# Patient Record
Sex: MALE | URBAN - METROPOLITAN AREA
[De-identification: names, ages, dates, MRNs, and addresses within clinical notes are randomized per-mention and may not be internally consistent; named-entity substitution may affect disease eponyms.]

---

## 2019-04-17 ENCOUNTER — APPOINTMENT (RX ONLY)
Dept: URBAN - METROPOLITAN AREA CLINIC 22 | Facility: CLINIC | Age: 24
Setting detail: DERMATOLOGY
End: 2019-04-17

## 2019-04-17 DIAGNOSIS — Z71.89 OTHER SPECIFIED COUNSELING: ICD-10-CM

## 2019-04-17 DIAGNOSIS — A63.0 ANOGENITAL (VENEREAL) WARTS: ICD-10-CM

## 2019-04-17 PROCEDURE — ? COUNSELING

## 2019-04-17 PROCEDURE — ? LIQUID NITROGEN

## 2019-04-17 PROCEDURE — ? PRESCRIPTION

## 2019-04-17 PROCEDURE — 17111 DESTRUCTION B9 LESIONS 15/>: CPT

## 2019-04-17 RX ORDER — IMIQUIMOD 50 MG/G
CREAM TOPICAL
Qty: 1 | Refills: 0 | Status: ERX | COMMUNITY
Start: 2019-04-17

## 2019-04-17 RX ADMIN — IMIQUIMOD 1: 50 CREAM TOPICAL at 00:00

## 2019-04-17 ASSESSMENT — LOCATION DETAILED DESCRIPTION DERM
LOCATION DETAILED: LEFT DORSAL SHAFT OF PENIS
LOCATION DETAILED: VENTRAL PENILE SHAFT
LOCATION DETAILED: RIGHT DORSAL SHAFT OF PENIS
LOCATION DETAILED: DORSAL GLANS
LOCATION DETAILED: DORSAL PENILE SHAFT

## 2019-04-17 ASSESSMENT — LOCATION ZONE DERM: LOCATION ZONE: PENIS

## 2019-04-17 ASSESSMENT — LOCATION SIMPLE DESCRIPTION DERM: LOCATION SIMPLE: PENIS

## 2019-04-17 NOTE — PROCEDURE: LIQUID NITROGEN
Post-Care Instructions: I reviewed with the patient in detail post-care instructions. Patient is to wear sunprotection, and avoid picking at any of the treated lesions. Pt may apply Vaseline to crusted or scabbing areas.
Medical Necessity Information: It is in your best interest to select a reason for this procedure from the list below. All of these items fulfill various CMS LCD requirements except the new and changing color options.
Include Z78.9 (Other Specified Conditions Influencing Health Status) As An Associated Diagnosis?: No
Consent: The patient's consent was obtained including but not limited to risks of crusting, scabbing, blistering, scarring, darker or lighter pigmentary change, recurrence, incomplete removal and infection.
Detail Level: Detailed
Number Of Freeze-Thaw Cycles: 3 freeze-thaw cycles
Medical Necessity Clause: This procedure was medically necessary because the lesions that were treated were:

## 2020-11-19 ENCOUNTER — APPOINTMENT (RX ONLY)
Dept: URBAN - METROPOLITAN AREA CLINIC 22 | Facility: CLINIC | Age: 25
Setting detail: DERMATOLOGY
End: 2020-11-19

## 2020-11-19 DIAGNOSIS — A63.0 ANOGENITAL (VENEREAL) WARTS: ICD-10-CM

## 2020-11-19 DIAGNOSIS — L942 OTHER SPECIFIED DISORDERS OF SKIN: ICD-10-CM

## 2020-11-19 DIAGNOSIS — L988 OTHER SPECIFIED DISORDERS OF SKIN: ICD-10-CM

## 2020-11-19 PROBLEM — D29.0 BENIGN NEOPLASM OF PENIS: Status: ACTIVE | Noted: 2020-11-19

## 2020-11-19 PROCEDURE — ? PRESCRIPTION

## 2020-11-19 PROCEDURE — 99213 OFFICE O/P EST LOW 20 MIN: CPT

## 2020-11-19 PROCEDURE — ? COUNSELING

## 2020-11-19 RX ORDER — IMIQUIMOD 50 MG/G
CREAM TOPICAL TIW
Qty: 1 | Refills: 1 | Status: ERX | COMMUNITY
Start: 2020-11-19

## 2020-11-19 RX ADMIN — IMIQUIMOD 1: 50 CREAM TOPICAL at 00:00

## 2020-11-19 ASSESSMENT — LOCATION ZONE DERM: LOCATION ZONE: PENIS

## 2020-11-19 ASSESSMENT — LOCATION DETAILED DESCRIPTION DERM
LOCATION DETAILED: DORSAL CORONA OF GLANS
LOCATION DETAILED: VENTRAL PENILE SHAFT

## 2020-11-19 ASSESSMENT — LOCATION SIMPLE DESCRIPTION DERM: LOCATION SIMPLE: PENIS

## 2021-02-01 ENCOUNTER — APPOINTMENT (OUTPATIENT)
Dept: PHYSICAL MEDICINE AND REHAB | Facility: MEDICAL CENTER | Age: 26
End: 2021-02-01

## 2021-02-01 NOTE — PROGRESS NOTES
New patient note    Physiatry (physical medicine and  Rehabilitation), interventional spine and sports medicine    Date of service: See epic    Chief complaint: No chief complaint on file.       Referring provider: No ref. provider found ***    HISTORY    HPI: Vincenzo Babb 25 y.o.  who presents today with There were no encounter diagnoses.    HPI    ***       Medical records review:  I reviewed the note from the referring provider No ref. provider found including the note dated ***.           ROS:   Red Flags ROS: ***  Fever, Chills, Sweats: Denies  Involuntary Weight Loss: Denies  Bladder Incontinence: Denies  Bowel Incontinence: denies  Saddle Anesthesia: Denies    All other systems reviewed and negative.       PMHx:   No past medical history on file.      Current Outpatient Medications on File Prior to Visit   Medication Sig Dispense Refill   • risperidone (RISPERDAL) 2 MG TABS Take 2 mg by mouth every bedtime.       No current facility-administered medications on file prior to visit.         PSHx:   No past surgical history on file.    Family history   No family history on file.      Medications: reviewed on [facility].   No outpatient medications have been marked as taking for the 2/1/21 encounter (Appointment) with Ernie Schwartz M.D..        Allergies:   No Known Allergies    Social Hx:   Social History     Socioeconomic History   • Marital status: Single     Spouse name: Not on file   • Number of children: Not on file   • Years of education: Not on file   • Highest education level: Not on file   Occupational History   • Not on file   Social Needs   • Financial resource strain: Not on file   • Food insecurity     Worry: Not on file     Inability: Not on file   • Transportation needs     Medical: Not on file     Non-medical: Not on file   Tobacco Use   • Smoking status: Not on file   Substance and Sexual Activity   • Alcohol use: Not on file   • Drug use: Not on file   • Sexual activity: Not on file   Lifestyle  "  • Physical activity     Days per week: Not on file     Minutes per session: Not on file   • Stress: Not on file   Relationships   • Social connections     Talks on phone: Not on file     Gets together: Not on file     Attends Orthodoxy service: Not on file     Active member of club or organization: Not on file     Attends meetings of clubs or organizations: Not on file     Relationship status: Not on file   • Intimate partner violence     Fear of current or ex partner: Not on file     Emotionally abused: Not on file     Physically abused: Not on file     Forced sexual activity: Not on file   Other Topics Concern   • Not on file   Social History Narrative   • Not on file         EXAMINATION     Physical Exam:   Vitals: There were no vitals taken for this visit.    Constitutional:   Body Habitus: There is no height or weight on file to calculate BMI.  Cooperation: Fully cooperates with exam  Appearance: Well-groomed, well-nourished, not disheveled ***    Eyes: No scleral icterus to suggest severe liver disease, no proptosis to suggest severe hyperthyroid    ENT -no obvious auditory deficits, no obvious tongue lesions, tongue midline, no facial droop     Skin -no rashes or lesions noted     Respiratory-  breathing comfortable on room air, no audible wheezing    Cardiovascular- capillary refills less than 2 seconds. No lower extremity edema is noted.     Gastrointestinal - no obvious abdominal masses, No tenderness to palpation in the abdomen    Psychiatric- alert and oriented ×3. Normal affect.     Gait - normal gait, no use of ambulatory device, nonantalgic. {ckwalkin}. ***    Musculoskeletal and Neuro -       Cervical spine   Inspection: No deformities of the skin over the cervical spine. No rashes or lesions.    {ck rom:::\"full \"}  active range of motion in all directions, {w-w/o:5700}  pain      Spurling’s sign: {ck r/l positive:85178::\"negative bilaterally\"}    No*** signs of muscular atrophy in " "bilateral upper extremities     ***No tenderness to palpation of the cervical spine    Positive for tenderness to palpation on the {ckrightleft:31484} side in the cervical facets.       Key points for the international standards for neurological classification of spinal cord injury (ISNCSCI) to light touch.     Dermatome R L   C4 2*** 2   C5 2 2   C6 2 2   C7 2 2   C8 2 2   T1 2 2   T2 2 2                                     Motor Exam Upper Extremities   ? Myotome R L   Shoulder flexion C5 ***5 5   Elbow flexion C5 5 5   Wrist extension C6 5 5   Elbow extension C7 5 5   Finger flexion C8 5 5   Finger abduction T1 5 5     Reflexes  ?  R L   Biceps  2+*** 2+   Brachioradialis  2+ 2+     Spring’s sign {ck r/l positive:20693::\"negative bilaterally\"}            Thoracic/Lumbar Spine/Sacral Spine/Hips   Inspection: No*** evidence of atrophy in bilateral lower extremities throughout     ROM: {ck rom:20692::\"full \"} active range of motion with flexion, lateral flexion, and rotation bilaterally.   There is {ck rom:20692::\"full \"} active range of motion with lumbar extension {w-w/o:5700} pain.    There {IS/IS NO:12912} pain with facet loading maneuver (extension rotation) with axial low back pain on the {ckrightleft:07592} side(s)    Palpation:   {ckttplumbar:20983::\"No tenderness to palpation in midline at T1-T12 levels. No tenderness to palpation in the left and right of the midline T1-L5\"}  palpation over SI joint: {ck r/l positive:20693::\"negative bilaterally\"}    palpation in hip or over the gluteus medius tendon insertion: {ck r/l positive:20693::\"negative bilaterally\"}      Lumbar spine Special tests  Neuro tension  Straight leg test {ck r/l positive:20693::\"negative bilaterally\"}    Slump test {ck r/l positive:20693::\"negative bilaterally\"}      HIP  FAIR test {ck r/l positive:20693::\"negative bilaterally\"}    Range of motion in the RIGHT hip is {ck rom:20692::\"full \"} in flexion, extension, abduction, internal " "rotation, external rotation.  Range of motion in the LEFT hip is {ck rom:20692::\"full \"} in flexion, extension, abduction, internal rotation, external rotation.      SI joint tests  Observation patient sits on one buttocks: Negative***  SI joint compression {ck r/l positive:20693::\"negative bilaterally\"}    SI joint distraction {ck r/l positive:20693::\"negative bilaterally\"}    Thigh thrust test {ck r/l positive:20693::\"negative bilaterally\"}    SHANON test {ck r/l positive:20693::\"negative bilaterally\"}                 Key points for the international standards for neurological classification of spinal cord injury (ISNCSCI) to light touch.     Dermatome R L                                      L2 2*** 2   L3 2 2   L4 2 2   L5 2 2   S1 2 2   S2 2 2       Motor Exam Lower Extremities    ? Myotome R L   Hip flexion L2 ***5 5   Knee extension L3 5 5   Ankle dorsiflexion L4 5 5   Toe extension L5 5 5   Ankle plantarflexion S1 5 5         Reflexes  ?  R L                Patella  2+ 2+   Achilles   2+ 2+       Babinski sign {ck r/l positive:20693::\"negative bilaterally\"}   Clonus of the ankle {ck r/l positive:20693::\"negative bilaterally\"}       MEDICAL DECISION MAKING    Medical records review: see under HPI section.     DATA    Labs: ***  No results found for: SODIUM, POTASSIUM, CHLORIDE, CO2, ANION, GLUCOSE, BUN, CREATININE, CALCIUM, ASTSGOT, ALTSGPT, TBILIRUBIN, ALBUMIN, TOTPROTEIN, GLOBULIN, AGRATIO]    No results found for: PROTHROMBTM, INR     No results found for: WBC, RBC, HEMOGLOBIN, HEMATOCRIT, MCV, MCH, MCHC, MPV, NEUTSPOLYS, LYMPHOCYTES, MONOCYTES, EOSINOPHILS, BASOPHILS, HYPOCHROMIA, ANISOCYTOSIS     No results found for: HBA1C     Imaging:   I personally reviewed following images, these are my reads  ***    IMAGING radiology reads. I reviewed the following radiology reads ***                                                                                                                                        " "      Results for orders placed during the hospital encounter of 10/26/14   DX-HAND 3+    Impression No radiographic evidence of acute traumatic injury.                  Results for orders placed during the hospital encounter of 06/11/14   DX-KNEE 3 VIEWS    Impression Unremarkable knee series.            INTERPRETING LOCATION:   [place], [id number]                                                 Diagnosis ***  {No diagnosis found. (Refresh or delete this SmartLink)}        ASSESSMENT AND PLAN:  Vincenzo Babb 25 y.o. male ***     There are no diagnoses linked to this encounter.      -***    PLAN  Physical therapy: I ordered physical therapy to focus on strengthening and stretching. ***    home exercise program: I provided the patient with a strengthening and stretching with a home exercise program ***    Diagnostic workup: ***    Medications: {ckintervention:43051::\"I recommend avoiding narcotic medications in this patient. \"}***    Interventional program: ***     Referrals: ***     Outside records requested:  The patient signed outside records request form for his outside records including outside images. This includes the records from {ckrequestrecords:23551}      Follow-up: After the above diagnostic studies ***          Please note that this dictation was created using voice recognition software. I have made every reasonable attempt to correct obvious errors but there may be errors of grammar and content that I may have overlooked prior to finalization of this note.      Ernie Schwartz MD  Physical Medicine and Rehabilitation  Interventional Spine and Sports Physiatry  [facility]           CC No ref. provider found ***.  "

## 2021-03-26 ENCOUNTER — OFFICE VISIT (OUTPATIENT)
Dept: PHYSICAL MEDICINE AND REHAB | Facility: MEDICAL CENTER | Age: 26
End: 2021-03-26

## 2021-03-26 VITALS
OXYGEN SATURATION: 96 % | HEIGHT: 72 IN | WEIGHT: 270.06 LBS | SYSTOLIC BLOOD PRESSURE: 124 MMHG | BODY MASS INDEX: 36.58 KG/M2 | HEART RATE: 85 BPM | DIASTOLIC BLOOD PRESSURE: 68 MMHG | TEMPERATURE: 97.6 F

## 2021-03-26 DIAGNOSIS — R20.2 PARESTHESIA OF LEFT ARM: ICD-10-CM

## 2021-03-26 DIAGNOSIS — G24.3 CERVICAL DYSTONIA: ICD-10-CM

## 2021-03-26 DIAGNOSIS — G24.3 LATEROCOLLIS: ICD-10-CM

## 2021-03-26 DIAGNOSIS — M54.2 CERVICALGIA: ICD-10-CM

## 2021-03-26 PROCEDURE — 99204 OFFICE O/P NEW MOD 45 MIN: CPT | Performed by: PHYSICAL MEDICINE & REHABILITATION

## 2021-03-26 RX ORDER — BACLOFEN 10 MG/1
5-10 TABLET ORAL 3 TIMES DAILY PRN
Qty: 90 TABLET | Refills: 1 | Status: SHIPPED | OUTPATIENT
Start: 2021-03-26 | End: 2021-04-25

## 2021-03-26 ASSESSMENT — PAIN SCALES - GENERAL: PAINLEVEL: 5=MODERATE PAIN

## 2021-03-26 ASSESSMENT — PATIENT HEALTH QUESTIONNAIRE - PHQ9: CLINICAL INTERPRETATION OF PHQ2 SCORE: 0

## 2021-03-26 NOTE — PROGRESS NOTES
"New patient note    Physiatry (physical medicine and  Rehabilitation), interventional spine and sports medicine    Date of Service: 03/26/2021    Chief complaint:   Chief Complaint   Patient presents with   • New Patient     Right shoulder and neck pain       HISTORY    HPI: Vincenzo Babb 25 y.o. male who presents today for evaluation of neck pain and \"cervical torticollis\".  He reports that it is difficult for him to turn to the left.  It tends to pul his neck to the right and his right shoulder.  This seemed to start in 2014.  He has been feeling like this is progressing.  No family history of this condition.    In 2019, he started some physical therapy. This was helpful and he had increased mobility and was able to do sports.     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    He does not have any headaches.  No history of injury.  Turning his head to the left worsens his pain.  His condition is starting to cause some social anxieties, as when at the checkout at a store.    So far, he has had physical therapy again starting this January at [facility], where he has worked with [facility].  This has been helpful, but not as helpful as before.  He does home exercises regularly.  Sometimes, he has numbness in his right hand, he is not sure which fingers.      Pain in his neck worsens as the day progresses.  Pain is a 8/10 on the NRS.  No balance changes.         Medical records review:  I reviewed the note from the referring provider     Previous treatments:    Physical Therapy: Yes    Medications the patient is tried: None    Previous interventions: none    Previous surgeries to relieve the above pain:  none      ROS:   Red Flags ROS:   Fever, Chills, Sweats: Denies  Involuntary Weight Loss: Denies  Bladder Incontinence: Denies  Bowel Incontinence: Denies  Saddle Anesthesia: Denies    All other systems reviewed and negative. "       PMHx:   History reviewed. No pertinent past medical history.    PSHx:   History reviewed. No pertinent surgical history.    Family history   Family History   Problem Relation Age of Onset   • Diabetes Mother          Medications:   Current Outpatient Medications   Medication   • baclofen (LIORESAL) 10 MG Tab     No current facility-administered medications for this visit.       Allergies:   No Known Allergies    Social Hx:   Social History     Socioeconomic History   • Marital status: Single     Spouse name: Not on file   • Number of children: Not on file   • Years of education: Not on file   • Highest education level: Not on file   Occupational History   • Not on file   Tobacco Use   • Smoking status: Never Smoker   • Smokeless tobacco: Never Used   Substance and Sexual Activity   • Alcohol use: Yes     Comment: Sometimes   • Drug use: Not Currently   • Sexual activity: Not on file   Other Topics Concern   •  Service No   • Blood Transfusions No   • Caffeine Concern No   • Occupational Exposure No   • Hobby Hazards No   • Sleep Concern No   • Stress Concern Yes   • Weight Concern No   • Special Diet No   • Back Care No   • Exercise Yes   • Bike Helmet Yes   • Seat Belt Yes   • Self-Exams No   Social History Narrative   • Not on file     Social Determinants of Health     Financial Resource Strain:    • Difficulty of Paying Living Expenses:    Food Insecurity:    • Worried About Running Out of Food in the Last Year:    • Ran Out of Food in the Last Year:    Transportation Needs:    • Lack of Transportation (Medical):    • Lack of Transportation (Non-Medical):    Physical Activity:    • Days of Exercise per Week:    • Minutes of Exercise per Session:    Stress:    • Feeling of Stress :    Social Connections:    • Frequency of Communication with Friends and Family:    • Frequency of Social Gatherings with Friends and Family:    • Attends Shinto Services:    • Active Member of Clubs or Organizations:     • Attends Club or Organization Meetings:    • Marital Status:    Intimate Partner Violence:    • Fear of Current or Ex-Partner:    • Emotionally Abused:    • Physically Abused:    • Sexually Abused:          EXAMINATION     Physical Exam:   Vitals: /68 (BP Location: Right arm, Patient Position: Sitting, BP Cuff Size: Adult long)   Pulse 85   Temp 36.4 °C (97.6 °F) (Temporal)   Ht 1.829 m (6')   Wt 123 kg (270 lb 1 oz)   SpO2 96%     Constitutional:   Body Habitus: Body mass index is 36.63 kg/m².  Cooperation: Fully cooperates with exam  Appearance: Well-groomed, well-nourished, not disheveled, in no acute distress    Eyes: No scleral icterus, no proptosis     ENT -no obvious auditory deficits, no obvious tongue lesions, tongue midline, no facial droop, laterocollis    Skin -no rashes or lesions noted     Respiratory-  breathing comfortable on room air, no audible wheezing    Cardiovascular- capillary refills less than 2 seconds. No lower extremity edema is noted.     Psychiatric- alert and oriented ×3. Normal affect.     Gait - normal gait, no use of ambulatory device, nonantalgic. The patient can toe walk with ease. The patient can heel walk with ease..     Musculoskeletal -   Cervical spine   Inspection: No deformities of the skin over the cervical spine. No rashes or lesions.    decreased A/P ROM in all directions, with  Pain, nearly neutral is possible, no left lateral side bending, decreased left lateral rotation and extension    Spurling’s sign: painful without radicular symptoms    No signs of muscular atrophy in bilateral upper extremities     No tenderness to palpation of the cervical facets      Neuro       Key points for the international standards for neurological classification of spinal cord injury (ISNCSCI) to light touch.     Dermatome R L   C4 2 2   C5 2 2   C6 2 2   C7 2 2   C8 2 2   T1 2 2   T2 2 2   L2 2 2   L3 2 2   L4 2 2   L5 2 2   S1 2 2   S2 2 2           Motor Exam Upper  Extremities   ? Myotome R L   Shoulder flexion C5 5 5   Elbow flexion C5 5 5   Wrist extension C6 5 5   Elbow extension C7 5 5   Finger flexion C8 5 5   Finger abduction T1 5 5         Motor Exam Lower Extremities    ? Myotome R L   Hip flexion L2 5 5   Knee extension L3 5 5   Ankle dorsiflexion L4 5 5   Toe extension L5 5 5   Ankle plantarflexion S1 5 5       Tinel's is negative at the wrists and elbows bilaterally  Spring’s sign negative bilaterally   Babinski sign negative bilaterally   Clonus of the ankle negative bilaterally     Reflexes  ?  R L   Biceps  2+ 2+   Brachioradialis  2+ 2+   Patella  2+ 2+   Achilles   2+ 2+       MEDICAL DECISION MAKING    Medical records review: see under HPI section.     DATA    Labs:   No results found for: SODIUM, POTASSIUM, CHLORIDE, CO2, ANION, GLUCOSE, BUN, CREATININE, CALCIUM, ASTSGOT, ALTSGPT, TBILIRUBIN, ALBUMIN, TOTPROTEIN, GLOBULIN, AGRATIO]    No results found for: PROTHROMBTM, INR     No results found for: WBC, RBC, HEMOGLOBIN, HEMATOCRIT, MCV, MCH, MCHC, MPV, NEUTSPOLYS, LYMPHOCYTES, MONOCYTES, EOSINOPHILS, BASOPHILS, HYPOCHROMIA, ANISOCYTOSIS     No results found for: HBA1C     Imaging: I personally reviewed following images, these are my reads  None reviewed    IMAGING radiology reads. I reviewed the following radiology reads   None reviewed    Diagnosis   Visit Diagnoses     ICD-10-CM   1. Cervical dystonia  G24.3   2. Cervicalgia  M54.2   3. Paresthesia of left arm  R20.2   4. Laterocollis  M53.1         ASSESSMENT:  Vincenzo Babb 25 y.o. male seen for above     Vincenzo was seen today for new patient.    Diagnoses and all orders for this visit:    Cervical dystonia  -     MR-CERVICAL SPINE-W/O; Future  -     baclofen (LIORESAL) 10 MG Tab; Take 0.5-1 Tablets by mouth 3 times a day as needed (spasms) for up to 30 days.    Cervicalgia  -     MR-CERVICAL SPINE-W/O; Future  -     baclofen (LIORESAL) 10 MG Tab; Take 0.5-1 Tablets by mouth 3 times a day as needed  (spasms) for up to 30 days.    Paresthesia of left arm  -     MR-CERVICAL SPINE-W/O; Future  -     baclofen (LIORESAL) 10 MG Tab; Take 0.5-1 Tablets by mouth 3 times a day as needed (spasms) for up to 30 days.    Laterocollis        1. Discussed anatomic pattern of laterocollis cervical dystonia.  MRI cervical spine ordered to evaluate radicular symptoms.  He continues to have these symptoms despite physical therapy and continued HEP.  2. Trial baclofen.  Discussed possible side effects.  If this is unsuccessful, discussed other possible medications and treatment, including botox injections.  Discussed some of his questions about this.    Follow-up: Return in about 5 weeks (around 4/30/2021).    Thank you very much for asking me to participate in Vincenzo Babb's care.  Please contact me with any questions or concerns.      Please note that this dictation was created using voice recognition software. I have made every reasonable attempt to correct obvious errors but there may be errors of grammar and content that I may have overlooked prior to finalization of this note.      Romie Tate MD  Physical Medicine and Rehabilitation  Interventional Spine and Sports Physiatry  [facility]